# Patient Record
Sex: FEMALE | Race: WHITE | Employment: STUDENT | URBAN - METROPOLITAN AREA
[De-identification: names, ages, dates, MRNs, and addresses within clinical notes are randomized per-mention and may not be internally consistent; named-entity substitution may affect disease eponyms.]

---
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
## 2024-09-13 ENCOUNTER — TELEPHONE (OUTPATIENT)
Dept: FAMILY MEDICINE CLINIC | Facility: CLINIC | Age: 19
End: 2024-09-13

## 2024-09-13 NOTE — TELEPHONE ENCOUNTER
Patient called and is asking if she can  a copy of her immunizations records, is needed for school. Please notify when ready for .     Ph [phone]

## 2024-09-19 ENCOUNTER — EMPLOYEE HEALTH (OUTPATIENT)
Dept: OTHER | Facility: HOSPITAL | Age: 19
End: 2024-09-19
Attending: PREVENTIVE MEDICINE

## 2024-09-19 DIAGNOSIS — Z11.1 SCREENING-PULMONARY TB: Primary | ICD-10-CM

## 2024-09-19 PROCEDURE — 86480 TB TEST CELL IMMUN MEASURE: CPT

## 2024-09-23 LAB
M TB IFN-G CD4+ T-CELLS BLD-ACNC: 0 IU/ML
M TB TUBERC IFN-G BLD QL: NEGATIVE
M TB TUBERC IGNF/MITOGEN IGNF CONTROL: >10 IU/ML
QFT TB1 AG MINUS NIL: 0 IU/ML
QFT TB2 AG MINUS NIL: 0 IU/ML

## (undated) NOTE — ED AVS SNAPSHOT
Parent/Legal Guardian Access to the Online [facility] Record of a Patient 15to 16Years Old  Return completed form by Secure email to [facility]/Medical Records Department: [email or web address]     Requirements and Procedures   Under [facility] MyChart ID and password with another person, that person may be able to view my or my child’s health information, and health information about someone who has authorized me as a MyChart proxy.    ·  I agree that it is my responsibility to select a confident Sign-Up Form and I agree to its terms.        Authorization Form     Please enter Patient’s information below:   Name (last, first, middle initial) __________________________________________   Gender  Male  Female    Last 4 Digits of Social Security Number Parent/Legal Guardian Signature                                  For Patient (1417 years of age)  I agree to allow my parent/legal guardian, named above, online access to my medical information currently available and that may become available as a result

## (undated) NOTE — LETTER
4/27/2021              Janis Darnel Kanslerinrinne 44         Dear Anita Brown,    This letter is to inform you that our office has made several attempts to reach you by phone without success.   We were attempting to contact yo

## (undated) NOTE — LETTER
VACCINE ADMINISTRATION RECORD  PARENT / GUARDIAN APPROVAL  Date: 2017  Vaccine administered to: Raven Solano     : 2005    MRN: [id number]    A copy of the appropriate Centers for Disease Control and Prevention Vaccine Information statement h

## (undated) NOTE — LETTER
[facility] [employer] of Child Health Examination       Student's Name  Lilliam Infante Da Title                           Date     Signature                                                                                                                                              Title PARENT/GUARDIAN AND VERIFIED BY HEALTH CARE PROVIDER    ALLERGIES  (Food, drug, insect, other)  Patient has no known allergies.  MEDICATION  (List all prescribed or taken on a regular basis.)    Current Outpatient Medications:   •  amoxicillin 875 MG Oral T EXAMINATION REQUIREMENTS    Entire section below to be completed by MD//APN/PA       PHYSICAL EXAMINATION REQUIREMENTS (head circumference if <33 years old):   /70   Pulse 87   Temp 99 °F (37.2 °C) (Temporal)   Resp 20   Ht 5' 6.9\" (1.699 m)   Wt Throat Yes  Musculoskeletal Yes    Mouth/Dental Yes  Spinal examination Yes    Cardiovascular/HTN Yes  Nutritional status Yes    Respiratory Yes                   Diagnosis of Asthma: No Mental Health Yes        Currently Prescribed Asthma Medication:

## (undated) NOTE — LETTER
State of [facility] [address] of Child Health Examination       Student's Name  Max Birth Colt Title                           Date     Signature                                                                                                                                              Title VERIFIED BY HEALTH CARE PROVIDER    ALLERGIES  (Food, drug, insect, other) MEDICATION  (List all prescribed or taken on a regular basis.)     Diagnosis of asthma?   Child wakes during the night coughing   Yes   No    Yes   No    Loss of function of one of p DIABETES SCREENING  BMI>85% age/sex  No And any two of the following:  Family History No   Ethnic Minority  No          Signs of Insulin Resistance (hypertension, dyslipidemia, polycystic ovarian syndrome, acanthosis nigricans)    No           At Risk  No Quick-relief  medication (e.g. Short Acting Beta Antagonist): No          Controller medication (e.g. inhaled corticosteroid):   No Other   NEEDS/MODIFICATIONS required in the school setting  None DIETARY Needs/Restrictions     None   SPECIAL INSTR

## (undated) NOTE — LETTER
Patient Name: Cesar Larson  : 2005  MRN: [id number]  Patient Address: [address] [place] [address] [address] [id number]      Coronavirus Disease 2019 (COVID-19)     [facility] is committed to the safety and well-being of our patients, members, Romulo Aguero If your symptoms get worse, call your healthcare provider immediately. 3. Get rest and stay hydrated.    4. If you have a medical appointment, call the healthcare provider ahead of time and tell them that you have or may have COVID-19.  5. For medical espinoza fever-reducing medications; and  · Improvement in respiratory symptoms (e.g., cough, shortness of breath); and  · At least 10 days have passed since symptoms first appeared OR if asymptomatic patient or date of symptom onset is unclear then use 10 days pos donors must:    · Have had a confirmed diagnosis of COVID-19  · Be symptom-free for at least 14 days*    *Some people will be required to have a repeat COVID-19 test in order to be eligible to donate.  If you’re instructed by Kate that a repeat test is r

## (undated) NOTE — LETTER
Name:  Linda Feliciano School Year:  12th Grade Class: Student ID No.:   Address:  [address] [place] [address] [address] [id number] Phone:  [phone] (home)  :  15year old   Name Relationship [address] Work Phone Home Phone Mobile Phone   1.  [facility] syndrome, Brugada syndrome, or catecholaminergic polymorphic ventricular tachycardia? No   15. Does anyone in your family have a heart problem, pacemaker, or implanted defibrillator? No   16.  Has anyone in your family had unexplained fainting, seizures, or of seizure disorder? No   37. Do you have headaches with exercise? No   38. Have you ever had numbness, tingling, or weakness in your arms or legs after being hit or falling? No   39. Have you ever been unable to move your arms / legs after being hit /fall? FINDINGS   Appearance:  Marfan stigmata (kyphoscoliosis, high-arched palate, pectus excavatum,      arachnodactyly, arm span > height, hyperlaxity, myopia, MVP, aortic insufficiency) Yes    Eyes/Ears/Nose/Throat:    · Pupils equal  · Hearing Yes    Lymph n use performance-enhancing substances as defined in the [facility] Performance-Enhancing Substance Testing Program Protocol.  We have reviewed the policy and understand that I/our student may be asked to submit to testing for the presence of performance-enhancing